# Patient Record
Sex: MALE | Race: WHITE | NOT HISPANIC OR LATINO | Employment: PART TIME | URBAN - METROPOLITAN AREA
[De-identification: names, ages, dates, MRNs, and addresses within clinical notes are randomized per-mention and may not be internally consistent; named-entity substitution may affect disease eponyms.]

---

## 2018-03-13 ENCOUNTER — OFFICE VISIT (OUTPATIENT)
Dept: FAMILY MEDICINE CLINIC | Facility: CLINIC | Age: 63
End: 2018-03-13
Payer: COMMERCIAL

## 2018-03-13 VITALS
HEART RATE: 78 BPM | DIASTOLIC BLOOD PRESSURE: 90 MMHG | SYSTOLIC BLOOD PRESSURE: 160 MMHG | BODY MASS INDEX: 35.7 KG/M2 | RESPIRATION RATE: 18 BRPM | HEIGHT: 71 IN | TEMPERATURE: 97.1 F | WEIGHT: 255 LBS

## 2018-03-13 DIAGNOSIS — R07.89 CHEST PRESSURE: Primary | ICD-10-CM

## 2018-03-13 DIAGNOSIS — R03.0 ELEVATED BLOOD PRESSURE READING: ICD-10-CM

## 2018-03-13 DIAGNOSIS — R06.02 SHORTNESS OF BREATH: ICD-10-CM

## 2018-03-13 PROBLEM — E03.8 OTHER SPECIFIED HYPOTHYROIDISM: Status: ACTIVE | Noted: 2018-03-13

## 2018-03-13 PROCEDURE — 99213 OFFICE O/P EST LOW 20 MIN: CPT | Performed by: FAMILY MEDICINE

## 2018-03-13 PROCEDURE — 93000 ELECTROCARDIOGRAM COMPLETE: CPT | Performed by: FAMILY MEDICINE

## 2018-03-13 PROCEDURE — 3008F BODY MASS INDEX DOCD: CPT | Performed by: FAMILY MEDICINE

## 2018-03-13 PROCEDURE — 1036F TOBACCO NON-USER: CPT | Performed by: FAMILY MEDICINE

## 2018-03-13 RX ORDER — LEVOTHYROXINE SODIUM 0.12 MG/1
TABLET ORAL
COMMUNITY
Start: 2018-03-12

## 2018-03-13 NOTE — PROGRESS NOTES
Min Ward 1955 male MRN: [id number]    Family Medicine Acute Visit    ASSESSMENT/PLAN  78-year-old male with the followin  Chest pressure - Concerning for possible CAD which may contribute to heart failure  An office ECG remarkable for left axis deviation  No ST segment changes  Will evaluate for CHF via CXR and BNP  If workup is positive, will obtain an echocardiogram and likely refer to Cardiology  -     POCT ECG  -     NT-BNP PRO; Future  -     XR chest pa & lateral; Future    2  Shortness of breath - Asymptomatic currently  Concerning for heart failure due to orthopnea and pitting edema  Will assess for cardiomegaly, possible pulmonary edema versus CAP  -     XR chest pa & lateral; Future  -     CBC and differential; Future    3  Elevated blood pressure - Blood pressure elevated today (160/90)  No prior history of hypertension  ([employer] EMR chart is empty )  Possibly associated with #1  Will recheck at follow-up visit  No antihypertensives for now  4  Return to clinic in 1-2 weeks to re-evaluate the above symptoms and review workup  No future appointments  SUBJECTIVE  CC: Cough and Cold Like Symptoms      HPI:  Min Ward is a 65 y o  male who presents for the sensation of a prolonged a cold  Had the flu which started on 18 with progressively worsening Sxs  Productive cough which was worse with laying down  About 4-5 days ago, had a cough (from the lower part of his lungs)  Yesterday, he experienced chest pressure which felt like a "500 lb person sitting on his chest"  Since then his chest pressure has resolved  Reports decreased energy, hearing and cognition over the past few months  Stop smoking about 20 years ago  Denies any past history of coronary artery disease  Pertinent family history of CHF in father  Review of Systems   Constitutional: Positive for fatigue  Negative for chills and fever     HENT: Negative for ear discharge, ear pain, sore throat and trouble swallowing  Respiratory: Positive for chest tightness  Cardiovascular: Positive for chest pain  Gastrointestinal: Negative for constipation, diarrhea, nausea and vomiting  Skin: Negative for rash  Historical Information   The patient history was reviewed as follows:  Past Medical History:   Diagnosis Date    Disease of thyroid gland          Past Surgical History:   Procedure Laterality Date    HERNIA REPAIR       No family history on file  Social History   History   Alcohol Use    Yes     History   Drug Use No     History   Smoking Status    Former Smoker   Smokeless Tobacco    Never Used       Medications:     Current Outpatient Prescriptions:     levothyroxine 125 mcg tablet, , Disp: , Rfl:     Multiple Vitamins-Minerals (MULTIVITAMIN MEN 50+) TABS, Take by mouth, Disp: , Rfl:     No Known Allergies    OBJECTIVE  Vitals:   Vitals:    03/13/18 1520   BP: 160/90   Pulse: 78   Resp: 18   Temp: (!) 97 1 °F (36 2 °C)   Weight: 116 kg (255 lb)   Height: 5' 11" (1 803 m)       Physical Exam   Constitutional: He is oriented to person, place, and time  He appears well-developed and well-nourished  No distress  HENT:   Head: Normocephalic and atraumatic  Right Ear: External ear normal    Left Ear: External ear normal    Eyes: Conjunctivae and EOM are normal  Pupils are equal, round, and reactive to light  Right eye exhibits no discharge  Left eye exhibits no discharge  No scleral icterus  Neck: Normal range of motion  JVD present  No tracheal deviation present  Cardiovascular: Normal rate and regular rhythm  No murmur heard  Questionable S4 heart sound  Pulmonary/Chest: Effort normal and breath sounds normal  No respiratory distress  He has no wheezes  He has no rales  Abdominal: Soft  Musculoskeletal: Normal range of motion  He exhibits edema (1+ pitting edema in both ankles)  Lymphadenopathy:     He has no cervical adenopathy     Neurological: He is alert and oriented to person, place, and time  Skin: Skin is warm  No rash noted  He is not diaphoretic  No erythema  Psychiatric: He has a normal mood and affect   His behavior is normal  Judgment and thought content normal

## 2018-07-17 ENCOUNTER — TELEPHONE (OUTPATIENT)
Dept: FAMILY MEDICINE CLINIC | Facility: CLINIC | Age: 63
End: 2018-07-17

## 2018-07-17 NOTE — TELEPHONE ENCOUNTER
Spoke with patient, states his symptoms have resolved  He does have an endocrinologist, who he recently saw and his blood pressure was okay  States he will call back again if anything changes

## 2018-07-17 NOTE — TELEPHONE ENCOUNTER
Could you please contact this patient to see how he is doing? He was advised to follow up 1-2 weeks after his March appointment